# Patient Record
Sex: FEMALE | HISPANIC OR LATINO | URBAN - METROPOLITAN AREA
[De-identification: names, ages, dates, MRNs, and addresses within clinical notes are randomized per-mention and may not be internally consistent; named-entity substitution may affect disease eponyms.]

---

## 2019-11-06 ENCOUNTER — APPOINTMENT (RX ONLY)
Dept: URBAN - METROPOLITAN AREA CLINIC 349 | Facility: CLINIC | Age: 34
Setting detail: DERMATOLOGY
End: 2019-11-06

## 2019-11-06 DIAGNOSIS — L29.89 OTHER PRURITUS: ICD-10-CM

## 2019-11-06 DIAGNOSIS — L29.8 OTHER PRURITUS: ICD-10-CM

## 2019-11-06 DIAGNOSIS — L30.9 DERMATITIS, UNSPECIFIED: ICD-10-CM

## 2019-11-06 PROBLEM — F41.9 ANXIETY DISORDER, UNSPECIFIED: Status: ACTIVE | Noted: 2019-11-06

## 2019-11-06 PROCEDURE — ? OTHER

## 2019-11-06 PROCEDURE — ? COUNSELING

## 2019-11-06 PROCEDURE — 99242 OFF/OP CONSLTJ NEW/EST SF 20: CPT | Mod: 25

## 2019-11-06 PROCEDURE — 11104 PUNCH BX SKIN SINGLE LESION: CPT

## 2019-11-06 PROCEDURE — ? PRESCRIPTION

## 2019-11-06 PROCEDURE — ? TREATMENT REGIMEN

## 2019-11-06 PROCEDURE — 96372 THER/PROPH/DIAG INJ SC/IM: CPT | Mod: 59

## 2019-11-06 PROCEDURE — A4550 SURGICAL TRAYS: HCPCS

## 2019-11-06 PROCEDURE — ? INTRAMUSCULAR KENALOG

## 2019-11-06 PROCEDURE — ? BIOPSY BY PUNCH METHOD

## 2019-11-06 RX ORDER — TRIAMCINOLONE ACETONIDE 1 MG/G
CREAM TOPICAL
Qty: 1 | Refills: 3 | Status: ERX | COMMUNITY
Start: 2019-11-06

## 2019-11-06 RX ADMIN — TRIAMCINOLONE ACETONIDE: 1 CREAM TOPICAL at 17:03

## 2019-11-06 ASSESSMENT — LOCATION DETAILED DESCRIPTION DERM
LOCATION DETAILED: LEFT PROXIMAL PRETIBIAL REGION
LOCATION DETAILED: LEFT LATERAL SUPERIOR CHEST
LOCATION DETAILED: RIGHT BUTTOCK
LOCATION DETAILED: LEFT DISTAL PRETIBIAL REGION

## 2019-11-06 ASSESSMENT — LOCATION SIMPLE DESCRIPTION DERM
LOCATION SIMPLE: LEFT PRETIBIAL REGION
LOCATION SIMPLE: RIGHT BUTTOCK
LOCATION SIMPLE: CHEST

## 2019-11-06 ASSESSMENT — LOCATION ZONE DERM
LOCATION ZONE: TRUNK
LOCATION ZONE: LEG

## 2019-11-06 NOTE — PROCEDURE: INTRAMUSCULAR KENALOG
Kenalog Preparation: kenalog
Total Volume (Ccs): 1.5
Detail Level: None
Add Option For Additional Mediation: No
Concentration (Mg/Ml): 40.0
Consent: The risks of atrophy were reviewed with the patient.
Concentration (Mg/Ml) Of Additional Medication: 2.5
Administered By (Optional): dorothy GARCIA

## 2019-11-06 NOTE — PROCEDURE: OTHER
Other (Free Text): Patient is here to discuss a rash on her arms and legs. Patient states it is itchy, bumpy and red. Patient states it appeared about a year ago. Patient states she went to her Primary care physician and they gave her a steroid shot but that did not help much and she has tried otc eucerin and Eucrisa. She states the Eucrisa helped a little but it burned when she put it on. Patient denies any changes in products or medications. 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 \\n\\nPatient states the flare on her forearms just appeared about one week ago. Patient is flared today on both arms, armpits and legs. Amanda explained that it does look like eczema today. Amanda discussed doing a punch biopsy of the area and patient would like to proceed that route. she discussed a biopsy because her rash didn’t improve with prednisone.  Offered that we could wait on doing lunch later if rash doesn’t improve but she has busy schedule so she wanted to do it today. \\n\\nWe will provide a topical steroid today and discussed the risks and side effects. Patient expressed understanding.\\n\\nWe will also provide patient with a steroid shot today. Discussed the risks and side effects. Last shot 6 weeks ago.
Note Text (......Xxx Chief Complaint.): This diagnosis correlates with the
Detail Level: Zone

## 2019-11-06 NOTE — PROCEDURE: TREATMENT REGIMEN
Initiate Treatment: Wash daily with gentle soaps \\n\\nClaritin daily \\n\\nTriamcinolone cream apply to affected areas twice daily for two weeks for flares\\n\\nVanicream deodorant\\n\\nDove soap \\n\\Osbaldo free and clear
Detail Level: Zone

## 2019-11-06 NOTE — PROCEDURE: BIOPSY BY PUNCH METHOD
Size Of Lesion In Cm (Optional): 0
Hemostasis: None
Suture Removal: 12 days
Home Suture Removal Text: Patient was provided a home suture removal kit and will remove their sutures at home.  If they have any questions or difficulties they will call the office.
Render In Bullet Format When Appropriate: No
Epidermal Sutures: 4-0 Prolene
Consent: Written consent was obtained and risks were reviewed including but not limited to scarring, infection, bleeding, scabbing, incomplete removal, nerve damage and allergy to anesthesia.
Punch Size In Mm: 3
Biopsy Type: H and E
Anesthesia Volume In Cc (Will Not Render If 0): 0.4
Anesthesia Type: 2% lidocaine with epinephrine
Dressing: pressure dressing with telfa
Billing Type: Third-Party Bill
Wound Care: Vaseline
Notification Instructions: Patient will be notified of biopsy results. However, patient instructed to call the office if not contacted within 2 weeks.
Bill For Surgical Tray: yes
Detail Level: Detailed
Post-Care Instructions: I reviewed with the patient in detail post-care instructions. Patient is to keep the biopsy site dry overnight, and then apply bacitracin twice daily until healed. Patient may apply hydrogen peroxide soaks to remove any crusting.
Accession #: [id number]

## 2019-11-06 NOTE — HPI: RASH
How Severe Is Your Rash?: mild
Is This A New Presentation, Or A Follow-Up?: Rash
Additional History: Patient is here to discuss a rash on her arms and legs. Patient states it is itchy, bumpy and red. Patient states it appeared about a year ago. Patient states she went to her Primary care physician and they gave her a steroid shot but that did not help much and she has tried otc eucerin and Eucrisa. She states the Eucrisa helped a little but it burned when she put it on. Patient denies any changes in products or medications. 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

## 2019-11-21 ENCOUNTER — APPOINTMENT (RX ONLY)
Dept: URBAN - METROPOLITAN AREA CLINIC 349 | Facility: CLINIC | Age: 34
Setting detail: DERMATOLOGY
End: 2019-11-21

## 2019-11-21 DIAGNOSIS — L30.8 OTHER SPECIFIED DERMATITIS: ICD-10-CM | Status: IMPROVED

## 2019-11-21 PROCEDURE — ? COUNSELING

## 2019-11-21 PROCEDURE — ? OTHER

## 2019-11-21 PROCEDURE — ? ORDER TESTS

## 2019-11-21 PROCEDURE — ? TREATMENT REGIMEN

## 2019-11-21 PROCEDURE — 99213 OFFICE O/P EST LOW 20 MIN: CPT

## 2019-11-21 ASSESSMENT — LOCATION SIMPLE DESCRIPTION DERM: LOCATION SIMPLE: LEFT PRETIBIAL REGION

## 2019-11-21 ASSESSMENT — LOCATION ZONE DERM: LOCATION ZONE: LEG

## 2019-11-21 ASSESSMENT — LOCATION DETAILED DESCRIPTION DERM: LOCATION DETAILED: LEFT PROXIMAL PRETIBIAL REGION

## 2019-11-21 NOTE — PROCEDURE: TREATMENT REGIMEN
Discontinue Regimen: Claritin
Detail Level: Zone
Continue Regimen: Wash daily with gentle soaps\\n\\nTriamcinolone cream apply to affected areas twice daily for two weeks when flared. Call if it’s not adequately controlled and we can send in something stronger

## 2019-11-21 NOTE — PROCEDURE: OTHER
Note Text (......Xxx Chief Complaint.): This diagnosis correlates with the
Other (Free Text): Amanda discussed results with patient in detail. We will get blood work before next visit. patient was given a lab slip. Patient has been using Triamcinolone since last visit and she states the rash resolved after the last visit. She states she has been using the topical steroid as prescribed and she thinks the steroid shot that we gave her seemed to help with her flare.\\n\\nClaire advised patient to contact office if she has a severe flare. \\n\\nWe will provide patient with a copy of her pathology results today.\\n\\nLupus was most likely dx per path report \\n\\nRecommended sunscreen daily\\n\\nPatient removed stitches at home. Site appearing to heal well
Detail Level: Zone

## 2020-06-26 ENCOUNTER — APPOINTMENT (RX ONLY)
Dept: URBAN - METROPOLITAN AREA CLINIC 349 | Facility: CLINIC | Age: 35
Setting detail: DERMATOLOGY
End: 2020-06-26

## 2020-06-26 DIAGNOSIS — L65.0 TELOGEN EFFLUVIUM: ICD-10-CM

## 2020-06-26 PROCEDURE — ? ORDER TESTS

## 2020-06-26 PROCEDURE — ? COUNSELING

## 2020-06-26 PROCEDURE — ? OTHER

## 2020-06-26 PROCEDURE — 99213 OFFICE O/P EST LOW 20 MIN: CPT

## 2020-06-26 ASSESSMENT — LOCATION ZONE DERM: LOCATION ZONE: FACE

## 2020-06-26 ASSESSMENT — LOCATION SIMPLE DESCRIPTION DERM: LOCATION SIMPLE: RIGHT FOREHEAD

## 2020-06-26 ASSESSMENT — LOCATION DETAILED DESCRIPTION DERM: LOCATION DETAILED: RIGHT SUPERIOR LATERAL FOREHEAD

## 2020-06-26 NOTE — HPI: HAIR LOSS
How Did The Hair Loss Occur?: gradual in onset
How Severe Is Your Hair Loss?: mild
Additional History: Patient stated that a couple years ago, her  brought it to her attention that her hair appeared to be thinning. Patient stated that it has gradually gotten worse and appears worse on the right temple. Patient was recently diagnosed with lupus around September last year. Patient wanted to make sure that it’s not related. Patient stated she had her thyroid levels checked a year ago and they were normal.

## 2020-06-26 NOTE — PROCEDURE: OTHER
Note Text (......Xxx Chief Complaint.): This diagnosis correlates with the
Detail Level: Generalized
Other (Free Text): Patient stated that a couple years ago, her  brought it to her attention that her hair appeared to be thinning. Patient stated that it has gradually gotten worse and appears worse on the right temple. Patient was recently diagnosed with lupus around September last year. Patient wanted to make sure that it’s not related. Patient stated she had her thyroid levels checked a year ago and they were normal. \\n\\nPatient stated that she has been dealing with emotional stress with work. \\n\\nPatient denies any changes in weight or any heavy mensural cycles. Patient did state that her mensurals have been a little longer. Patient denies any medications changes. \\n\\nReassured patient that it doesn’t appear like a scarring hair loss or that she has lupus on her scalp. Call if she develops any other symptoms or inflammation and we may biopsy. suggested patient eat a well balance diet with protein \\n\\nNo signs of scarring hair loss today. \\n\\nPatient stated she has been borderline anemic. Offered to do blood work. \\n\\nPatient is moving to [place] in August.\\n\\nNo diet changes. Not vegetarian or vegan.